# Patient Record
Sex: FEMALE | Race: BLACK OR AFRICAN AMERICAN | Employment: FULL TIME | URBAN - METROPOLITAN AREA
[De-identification: names, ages, dates, MRNs, and addresses within clinical notes are randomized per-mention and may not be internally consistent; named-entity substitution may affect disease eponyms.]

---

## 2021-05-19 ENCOUNTER — HOSPITAL ENCOUNTER (EMERGENCY)
Facility: CLINIC | Age: 26
Discharge: HOME | End: 2021-05-20
Attending: EMERGENCY MEDICINE | Admitting: EMERGENCY MEDICINE
Payer: COMMERCIAL

## 2021-05-19 ENCOUNTER — APPOINTMENT (OUTPATIENT)
Dept: CT IMAGING | Facility: CLINIC | Age: 26
End: 2021-05-19
Attending: EMERGENCY MEDICINE
Payer: COMMERCIAL

## 2021-05-19 VITALS
HEART RATE: 91 BPM | SYSTOLIC BLOOD PRESSURE: 151 MMHG | DIASTOLIC BLOOD PRESSURE: 101 MMHG | OXYGEN SATURATION: 99 % | TEMPERATURE: 97.3 F | RESPIRATION RATE: 16 BRPM

## 2021-05-19 DIAGNOSIS — G43.001 MIGRAINE WITHOUT AURA AND WITH STATUS MIGRAINOSUS, NOT INTRACTABLE: ICD-10-CM

## 2021-05-19 PROCEDURE — 70450 CT HEAD/BRAIN W/O DYE: CPT

## 2021-05-19 PROCEDURE — 250N000011 HC RX IP 250 OP 636: Performed by: EMERGENCY MEDICINE

## 2021-05-19 PROCEDURE — 99284 EMERGENCY DEPT VISIT MOD MDM: CPT | Mod: 25

## 2021-05-19 PROCEDURE — 96374 THER/PROPH/DIAG INJ IV PUSH: CPT

## 2021-05-19 PROCEDURE — 96375 TX/PRO/DX INJ NEW DRUG ADDON: CPT

## 2021-05-19 RX ORDER — KETOROLAC TROMETHAMINE 15 MG/ML
15 INJECTION, SOLUTION INTRAMUSCULAR; INTRAVENOUS ONCE
Status: COMPLETED | OUTPATIENT
Start: 2021-05-19 | End: 2021-05-19

## 2021-05-19 RX ORDER — ONDANSETRON 2 MG/ML
4 INJECTION INTRAMUSCULAR; INTRAVENOUS ONCE
Status: COMPLETED | OUTPATIENT
Start: 2021-05-19 | End: 2021-05-19

## 2021-05-19 RX ADMIN — KETOROLAC TROMETHAMINE 15 MG: 15 INJECTION, SOLUTION INTRAMUSCULAR; INTRAVENOUS at 20:58

## 2021-05-19 RX ADMIN — ONDANSETRON 4 MG: 2 INJECTION INTRAMUSCULAR; INTRAVENOUS at 20:58

## 2021-05-19 NOTE — ED TRIAGE NOTES
26 year old female presents with a migraine x2 days. Patient states it does feel like her typical migraine, but today she opened her eyes and it was black for a couple seconds. Patient's vision is now normal. Endorses nausea. GCS 15.

## 2021-05-19 NOTE — LETTER
May 20, 2021      To Whom It May Concern:      Tristan Stinson was seen in our Emergency Department today, 05/20/21.  I expect her condition to improve over the next 2 days.  She may return to work/school when improved.    Sincerely,        Elkin Cortez MD

## 2021-05-20 ENCOUNTER — DOCUMENTATION ONLY (OUTPATIENT)
Dept: OTHER | Facility: CLINIC | Age: 26
End: 2021-05-20

## 2021-05-20 ENCOUNTER — MEDICAL CORRESPONDENCE (OUTPATIENT)
Dept: HEALTH INFORMATION MANAGEMENT | Facility: CLINIC | Age: 26
End: 2021-05-20

## 2021-05-20 RX ORDER — METOCLOPRAMIDE 5 MG/1
5 TABLET ORAL
Qty: 20 TABLET | Refills: 0 | Status: SHIPPED | OUTPATIENT
Start: 2021-05-20

## 2021-05-20 RX ORDER — KETOROLAC TROMETHAMINE 10 MG/1
10 TABLET, FILM COATED ORAL EVERY 6 HOURS PRN
Qty: 20 TABLET | Refills: 0 | Status: SHIPPED | OUTPATIENT
Start: 2021-05-20

## 2021-05-20 ASSESSMENT — ENCOUNTER SYMPTOMS
HEADACHES: 1
NAUSEA: 1
PHOTOPHOBIA: 1

## 2021-05-20 NOTE — DISCHARGE INSTRUCTIONS
Have performed a CT due to your concerns for brain tumor and this is normal.  Please follow-up with a neurologist to discuss preventative medications okay to trial oral Toradol and Reglan with Benadryl for migraine at home.

## 2021-05-20 NOTE — ED PROVIDER NOTES
History   Chief Complaint:  Headache       The history is provided by the patient.      Tristan Stinson is a 26 year old female with history of migraines who presents with a headache. The patient says that her headache began about 60 hours ago, and has been localized behind the eyes. The patient reports that these kinds of migraines have been happening since she was in the second grade. They usually last 3-4 days and occur in the same spot. However, she says that was sleeping on the couch when she woke up and opened her eyes, she could not see anything for a few seconds.  Today, the patient reports some light sensitivity and nausea, though she claims that she usually does not experience any nausea. At its worst, the patient reports the pain being a 9/10, but as she presents in the ED, it has improved to a 2/10 with interventions. She took ibuprofen at home with no relief. She was given 5 tablets of Naproxen from her mother, which she took around 1300 today with temporary relief. She has been prescribed Imitrex but does not like taking this. The patient does not believe she is pregnant. No recent MRI scans or previous neurology consults. Last eye appointment was Dec 2020.     Review of Systems   Eyes: Positive for photophobia and visual disturbance (since resolved).   Gastrointestinal: Positive for nausea.   Neurological: Positive for headaches.   All other systems reviewed and are negative.    Allergies:  The patient has no known allergies.    Medications:  Juleber  Levothyroxine     Past Medical History:    Grave's disease  Polydipsia  Postablative hypothyroidism  Varicella  Migraine    Family History:    Father: Hyperlipidemia, hypertension  Mother: Endometriosis    Social History:  Present to the ED with fiance.     Physical Exam     Patient Vitals for the past 24 hrs:   BP Temp Temp src Pulse Resp SpO2   05/19/21 1856 (!) 151/101 97.3  F (36.3  C) Temporal 91 16 99 %       Physical Exam  Vitals signs reviewed.    Constitutional:       Appearance: She is obese.   HENT:      Head: Normocephalic.   Cardiovascular:      Rate and Rhythm: Normal rate and regular rhythm.   Pulmonary:      Effort: Pulmonary effort is normal.   Abdominal:      General: Abdomen is flat.      Palpations: Abdomen is soft.   Skin:     General: Skin is warm.      Capillary Refill: Capillary refill takes less than 2 seconds.   Neurological:      General: No focal deficit present.      Mental Status: She is alert and oriented to person, place, and time. Mental status is at baseline.   Psychiatric:         Mood and Affect: Mood normal.           Emergency Department Course     Imaging:  Head CT w/o contrast  Normal head CT.  As per radiology.    Emergency Department Course:    Reviewed:  I reviewed nursing notes, vitals, past medical history and care everywhere    Assessments:  2336 I obtained history and examined the patient as noted above.     0211 I rechecked the patient and explained findings.     Interventions:  2058 Zofran 4 mg IV  2058 Toradol 15 mg IV     Disposition:  The patient was discharged to home.     Impression & Plan     Medical Decision Making:  Patient is a 26-year-old female with a history of migraines who presents with an episode of vision loss binocular as well as headache.  On arrival patient is well-appearing does have a history of migraines clinically suspect similar however patient seems quite concerned about her brain as a cause for headache.  Patient has not seen a neurologist.  Offered CT of the head as an initial screening evaluation and was negative.  Story is not good for subarachnoid hemorrhage no clinical concern for meningitis patient has no risk factors for CVT.  Recommend discharge to follow-up with neurology will offer Reglan with Benadryl as a bridge to follow-up with primary care and asked to limit Imitrex and take due to concerns for rebound headache from overuse of Imitrex.          Diagnosis:    ICD-10-CM    1.  Migraine without aura and with status migrainosus, not intractable  G43.001        Discharge Medications:  New Prescriptions    KETOROLAC (TORADOL) 10 MG TABLET    Take 1 tablet (10 mg) by mouth every 6 hours as needed for moderate pain    METOCLOPRAMIDE (REGLAN) 5 MG TABLET    Take 1 tablet (5 mg) by mouth 4 times daily (before meals and nightly)       Scribe Disclosure:  I, Alireza Martins, am serving as a scribe at 11:35 PM on 5/19/2021 to document services personally performed by Elkin Cortez MD based on my observations and the provider's statements to me.     I, Rbobie Branch, am serving as a scribe  at 2:15 AM on 5/20/2021 to document services personally performed by Elkin Cortez MD based on my observations and the provider's statements to me.                Elkin Cortez MD  05/22/21 1820

## 2025-01-19 ENCOUNTER — HOSPITAL ENCOUNTER (EMERGENCY)
Facility: CLINIC | Age: 30
Discharge: HOME | End: 2025-01-19
Attending: EMERGENCY MEDICINE | Admitting: EMERGENCY MEDICINE

## 2025-01-19 VITALS
BODY MASS INDEX: 39.8 KG/M2 | SYSTOLIC BLOOD PRESSURE: 118 MMHG | WEIGHT: 224.65 LBS | HEART RATE: 90 BPM | DIASTOLIC BLOOD PRESSURE: 73 MMHG | RESPIRATION RATE: 18 BRPM | TEMPERATURE: 98.6 F | HEIGHT: 63 IN | OXYGEN SATURATION: 98 %

## 2025-01-19 DIAGNOSIS — J10.1 INFLUENZA A: ICD-10-CM

## 2025-01-19 DIAGNOSIS — R05.9 COUGH, UNSPECIFIED TYPE: ICD-10-CM

## 2025-01-19 LAB
FLUAV RNA SPEC QL NAA+PROBE: POSITIVE
FLUBV RNA RESP QL NAA+PROBE: NEGATIVE
RSV RNA SPEC NAA+PROBE: NEGATIVE
SARS-COV-2 RNA RESP QL NAA+PROBE: NEGATIVE

## 2025-01-19 PROCEDURE — 99283 EMERGENCY DEPT VISIT LOW MDM: CPT

## 2025-01-19 PROCEDURE — 87637 SARSCOV2&INF A&B&RSV AMP PRB: CPT | Performed by: EMERGENCY MEDICINE

## 2025-01-19 RX ORDER — ALBUTEROL SULFATE 90 UG/1
2 INHALANT RESPIRATORY (INHALATION) ONCE
Status: DISCONTINUED | OUTPATIENT
Start: 2025-01-19 | End: 2025-01-19 | Stop reason: HOSPADM

## 2025-01-19 ASSESSMENT — ACTIVITIES OF DAILY LIVING (ADL): ADLS_ACUITY_SCORE: 41

## 2025-01-19 ASSESSMENT — COLUMBIA-SUICIDE SEVERITY RATING SCALE - C-SSRS
1. IN THE PAST MONTH, HAVE YOU WISHED YOU WERE DEAD OR WISHED YOU COULD GO TO SLEEP AND NOT WAKE UP?: NO
6. HAVE YOU EVER DONE ANYTHING, STARTED TO DO ANYTHING, OR PREPARED TO DO ANYTHING TO END YOUR LIFE?: NO
2. HAVE YOU ACTUALLY HAD ANY THOUGHTS OF KILLING YOURSELF IN THE PAST MONTH?: NO

## 2025-01-20 NOTE — ED PROVIDER NOTES
"  Emergency Department Note      History of Present Illness     Chief Complaint   Shortness of Breath      HPI   Tristan Stinson is a 29 year old female  who presents for shortness of breath and a cough. She was sent from  for concern of a PE. The patient had Covid during her first pregnancy, she lost 30 pounds and her pregnancy became high risk, this prompted the visit today. The patient reports having a cough  with increasing shortness of breath for the last 2 days. She has no personal history of asthma. Denies fever. She was started on delsym and Mucinex on 1/10  and started amoxicillin a week later No VB VD LOF CTX or abdominal pain.        Independent Historian   None    Review of External Notes       Past Medical History     Medical History and Problem List   No past medical history on file.    Medications   HYDROcodone-acetaminophen 5-325 MG per tablet  ketorolac (TORADOL) 10 MG tablet  levothyroxine (SYNTHROID) 25 mcg/mL  metoclopramide (REGLAN) 5 MG tablet        Surgical History   No past surgical history on file.    Physical Exam     Patient Vitals for the past 24 hrs:   BP Temp Temp src Pulse Resp SpO2 Height Weight   25 1853 136/83 98.6  F (37  C) Oral 90 18 100 % 1.6 m (5' 3\") 101.9 kg (224 lb 10.4 oz)     Physical Exam  General: Patient is well appearing. No distress.  Head: Atraumatic.  Eyes: Conjunctivae and EOM are normal. No scleral icterus.  Neck: Normal range of motion. Neck supple.   Cardiovascular: Normal rate, regular rhythm, normal heart sounds and intact distal pulses.   Pulmonary/Chest: Breath sounds normal. No stridor, rales or wheezing. No respiratory distress.  Abdominal: Soft. Bowel sounds are normal. No distension. No tenderness. No rebound or guarding. Gravid   Musculoskeletal: Normal range of motion.  Skin: Warm and dry. No rash noted. Not diaphoretic.       Diagnostics     Lab Results   Labs Ordered and Resulted from Time of ED Arrival to Time of ED Departure   INFLUENZA " A/B, RSV AND SARS-COV2 PCR - Abnormal       Result Value    Influenza A PCR Positive (*)     Influenza B PCR Negative      RSV PCR Negative      SARS CoV2 PCR Negative         Imaging   No orders to display       Independent Interpretation   None    ED Course      Medications Administered   Medications   albuterol (PROVENTIL HFA/VENTOLIN HFA) inhaler (has no administration in time range)       Procedures   Procedures     Discussion of Management   None    ED Course   ED Course as of 25   [place]  I obtained history and examined the patient as noted above         Additional Documentation  None    Medical Decision Making / Diagnosis       MDM   Tristan Stinson is a 29 year old female , currently pregnant, who presents for evaluation of cough. This is consistent with an influenza.   They are at risk for pneumonia but no signs of this are detected on today's visit.  Close followup of primary care physician is indicated and return to the ED for high fevers > 103 for more than 48 hours more, increasing productive cough, shortness of breath, or confusion.  There is no signs of serious bacterial infection such as bacteremia, meningitis, UTI/pyelonephritis, strep pharyngitis, etc.        Disposition   The patient was discharged.     Diagnosis     ICD-10-CM    1. Cough, unspecified type  R05.9       2. Influenza A  J10.1            Discharge Medications   New Prescriptions    No medications on file         Scribe Disclosure:  I, Osei Daley, am serving as a scribe at 8:54 PM on 2025 to document services personally performed by Duy Rojas MD based on my observations and the provider's statements to me.        Duy Rojas MD  25 0118

## 2025-01-20 NOTE — ED TRIAGE NOTES
Patient arrives from . She endorses being 24 weeks pregnant, cough x3 weeks with worsening shortness of breath.  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 In triage AVSS, A&O, Ambulatory.      Triage Assessment (Adult)       Row Name 01/19/25 8753          Respiratory WDL    Respiratory WDL X        Skin Circulation/Temperature WDL    Skin Circulation/Temperature WDL WDL        Cardiac WDL    Cardiac WDL WDL        Peripheral/Neurovascular WDL    Peripheral Neurovascular WDL WDL        Cognitive/Neuro/Behavioral WDL    Cognitive/Neuro/Behavioral WDL WDL                      Patient due for annual